# Patient Record
Sex: MALE | Race: WHITE | Employment: FULL TIME | URBAN - METROPOLITAN AREA
[De-identification: names, ages, dates, MRNs, and addresses within clinical notes are randomized per-mention and may not be internally consistent; named-entity substitution may affect disease eponyms.]

---
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
## 2019-04-18 ENCOUNTER — OFFICE VISIT - HEALTHEAST (OUTPATIENT)
Dept: FAMILY MEDICINE | Facility: CLINIC | Age: 19
End: 2019-04-18

## 2019-04-18 DIAGNOSIS — F32.4 MAJOR DEPRESSIVE DISORDER WITH SINGLE EPISODE, IN PARTIAL REMISSION (H): ICD-10-CM

## 2019-04-18 DIAGNOSIS — Z00.00 HEALTHCARE MAINTENANCE: ICD-10-CM

## 2019-04-18 DIAGNOSIS — F98.8 ATTENTION DEFICIT DISORDER, UNSPECIFIED HYPERACTIVITY PRESENCE: ICD-10-CM

## 2019-04-18 LAB — HIV 1+2 AB+HIV1 P24 AG SERPL QL IA: NEGATIVE

## 2019-04-18 ASSESSMENT — MIFFLIN-ST. JEOR: SCORE: 2229.25

## 2019-04-19 LAB
C TRACH DNA SPEC QL PROBE+SIG AMP: NEGATIVE
N GONORRHOEA DNA SPEC QL NAA+PROBE: NEGATIVE

## 2019-04-22 ENCOUNTER — COMMUNICATION - HEALTHEAST (OUTPATIENT)
Dept: FAMILY MEDICINE | Facility: CLINIC | Age: 19
End: 2019-04-22

## 2019-10-16 ENCOUNTER — AMBULATORY - HEALTHEAST (OUTPATIENT)
Dept: FAMILY MEDICINE | Facility: CLINIC | Age: 19
End: 2019-10-16

## 2019-10-16 DIAGNOSIS — F32.4 MAJOR DEPRESSIVE DISORDER WITH SINGLE EPISODE, IN PARTIAL REMISSION (H): ICD-10-CM

## 2019-10-25 NOTE — PROGRESS NOTES
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  Less social interactions because of work.  History of depression for years.  Has been on lexapro for 2 years. It helped but still feeling down depressed, withdrawn.  Lives with best friends, she too has depression and they look out for each other.  Also have weekly meet up with close friends to hand out with .  Strong family history of depression- mom takes medications-  Half Brother also takes medications .    He reports would like to stay on lexapro- has helped him in past.  Willing to try another medications  Is in counseling-once a week on Monday.    History of attention deficit hyperactivity disorder diagnosed in childhood- by a psychologist back child psychologist at  of .  Adderall 30 mg extended release helps  adderall last prescription - was from [facility]- and now out of it for past 4 days, kept making appointment and over sleeping & missed appointment     No side effects from  Adderall. Lower doses do not work.  Without adderall, un focus, unable to follow through with task    BP Readings from Last 3 Encounters:   10/28/19 113/68   10/12/15 106/70 (11 %/ 48 %)*   10/09/15 105/71 (10 %/ 54 %)*     *BP percentiles are based on the August 2017 AAP Clinical Practice Guideline for boys    Wt Readings from Last 3 Encounters:   10/28/19 112.7 kg (248 lb 6.4 oz)   10/12/15 99.8 kg (220 lb) (99 %)*   10/09/15 99.8 kg (220 lb 1.6 oz) (99 %)*     * Growth percentiles are based on CDC (Boys, 2-20 Years) data.                    PROBLEMS TO ADD ON...  Reviewed and updated as needed this visit by Provider         Review of Systems   ROS COMP:  "Constitutional, HEENT, cardiovascular, pulmonary, GI, , musculoskeletal, neuro, skin, endocrine and psych systems are negative, except as otherwise noted.      Objective    /68 (BP Location: Left arm, Patient Position: Sitting, Cuff Size: Adult Large)   Pulse 80   Temp 97.5  F (36.4  C) (Oral)   Resp 14   Ht 1.918 m (6' 3.5\")   Wt 112.7 kg (248 lb 6.4 oz)   SpO2 98%   BMI 30.64 kg/m    Body mass index is 30.64 kg/m .  Physical Exam   GENERAL: healthy, alert and no distress  NECK: no adenopathy, no asymmetry, masses, or scars and thyroid normal to palpation  RESP: lungs clear to auscultation - no rales, rhonchi or wheezes  CV: regular rates and rhythm and normal S1 S2, no S3 or S4  ABDOMEN: soft, nontender, no hepatosplenomegaly, no masses and bowel sounds normal  SKIN: no suspicious lesions or rashes  PSYCH: mentation appears normal, affect normal/bright  PHQ-9 SCORE 10/28/2019   PHQ-9 Total Score 14     Diagnostic Test Results:  Labs reviewed in Epic        Assessment & Plan   20 yo with long standing history of depression, & attention deficit hyperactivity disorder refills as following   1. Moderate episode of recurrent major depressive disorder (H)  Add Wellbutrin  Continue counseling  Follow up in TE, in 2 weeks and then in 6 weeks ,  Return office visit     - escitalopram (LEXAPRO) 10 MG tablet; Take 1 tablet (10 mg) by mouth daily  Dispense: 30 tablet; Refill: 3  - buPROPion (WELLBUTRIN XL) 150 MG 24 hr tablet; Take 1 tablet (150 mg) by mouth every morning  Dispense: 30 tablet; Refill: 3  Potential medication side effects were discussed with the patient; let me know if any occur.    2. Attention deficit hyperactivity disorder (ADHD), combined type  -controlled substance agreement- signed & scan for scanning.  Urine tox screen in future- random.  Return office visit in 3 month for first yr  - Drug Abuse Screen Panel 13, Urine (Pain Care Package); Future  - amphetamine-dextroamphetamine (ADDERALL " "XR) 30 MG 24 hr capsule; Take 1 capsule (30 mg) by mouth daily  Dispense: 30 capsule; Refill: 0  - amphetamine-dextroamphetamine (ADDERALL XR) 30 MG 24 hr capsule; Take 1 capsule (30 mg) by mouth daily  Dispense: 30 capsule; Refill: 0  - amphetamine-dextroamphetamine (ADDERALL XR) 30 MG 24 hr capsule; Take 1 capsule (30 mg) by mouth daily  Dispense: 30 capsule; Refill: 0     BMI:   Estimated body mass index is 30.64 kg/m  as calculated from the following:    Height as of this encounter: 1.918 m (6' 3.5\").    Weight as of this encounter: 112.7 kg (248 lb 6.4 oz).   Weight management plan: Discussed healthy diet and exercise guidelines        Work on weight loss  Regular exercise    Return in about 6 weeks (around 12/9/2019) for mood.    Malou Mayer MD  [facility]        "

## 2019-10-28 ENCOUNTER — OFFICE VISIT (OUTPATIENT)
Dept: FAMILY MEDICINE | Facility: CLINIC | Age: 19
End: 2019-10-28
Payer: COMMERCIAL

## 2019-10-28 VITALS
BODY MASS INDEX: 30.25 KG/M2 | HEART RATE: 80 BPM | TEMPERATURE: 97.5 F | DIASTOLIC BLOOD PRESSURE: 68 MMHG | OXYGEN SATURATION: 98 % | RESPIRATION RATE: 14 BRPM | WEIGHT: 248.4 LBS | SYSTOLIC BLOOD PRESSURE: 113 MMHG | HEIGHT: 76 IN

## 2019-10-28 DIAGNOSIS — Z79.899 CONTROLLED SUBSTANCE AGREEMENT SIGNED: ICD-10-CM

## 2019-10-28 DIAGNOSIS — Z11.8 SCREENING FOR CHLAMYDIAL DISEASE: ICD-10-CM

## 2019-10-28 DIAGNOSIS — F33.1 MODERATE EPISODE OF RECURRENT MAJOR DEPRESSIVE DISORDER (H): Primary | ICD-10-CM

## 2019-10-28 DIAGNOSIS — F90.2 ATTENTION DEFICIT HYPERACTIVITY DISORDER (ADHD), COMBINED TYPE: ICD-10-CM

## 2019-10-28 PROCEDURE — 87491 CHLMYD TRACH DNA AMP PROBE: CPT | Performed by: FAMILY MEDICINE

## 2019-10-28 PROCEDURE — 99203 OFFICE O/P NEW LOW 30 MIN: CPT | Performed by: FAMILY MEDICINE

## 2019-10-28 RX ORDER — DEXTROAMPHETAMINE SACCHARATE, AMPHETAMINE ASPARTATE MONOHYDRATE, DEXTROAMPHETAMINE SULFATE AND AMPHETAMINE SULFATE 7.5; 7.5; 7.5; 7.5 MG/1; MG/1; MG/1; MG/1
30 CAPSULE, EXTENDED RELEASE ORAL DAILY
Qty: 30 CAPSULE | Refills: 0 | Status: SHIPPED | OUTPATIENT
Start: 2019-10-28 | End: 2019-11-27

## 2019-10-28 RX ORDER — DEXTROAMPHETAMINE SACCHARATE, AMPHETAMINE ASPARTATE MONOHYDRATE, DEXTROAMPHETAMINE SULFATE AND AMPHETAMINE SULFATE 7.5; 7.5; 7.5; 7.5 MG/1; MG/1; MG/1; MG/1
30 CAPSULE, EXTENDED RELEASE ORAL DAILY
Qty: 30 CAPSULE | Refills: 0 | Status: SHIPPED | OUTPATIENT
Start: 2019-12-29 | End: 2020-01-28

## 2019-10-28 RX ORDER — DEXTROAMPHETAMINE SACCHARATE, AMPHETAMINE ASPARTATE, DEXTROAMPHETAMINE SULFATE AND AMPHETAMINE SULFATE 3.75; 3.75; 3.75; 3.75 MG/1; MG/1; MG/1; MG/1
15 TABLET ORAL 2 TIMES DAILY
Qty: 60 TABLET | Refills: 0 | Status: CANCELLED | OUTPATIENT
Start: 2019-10-28

## 2019-10-28 RX ORDER — DEXTROAMPHETAMINE SACCHARATE, AMPHETAMINE ASPARTATE MONOHYDRATE, DEXTROAMPHETAMINE SULFATE AND AMPHETAMINE SULFATE 7.5; 7.5; 7.5; 7.5 MG/1; MG/1; MG/1; MG/1
30 CAPSULE, EXTENDED RELEASE ORAL DAILY
Qty: 30 CAPSULE | Refills: 0 | Status: SHIPPED | OUTPATIENT
Start: 2019-11-28 | End: 2019-12-28

## 2019-10-28 RX ORDER — ESCITALOPRAM OXALATE 10 MG/1
10 TABLET ORAL DAILY
Qty: 30 TABLET | Refills: 3 | Status: SHIPPED | OUTPATIENT
Start: 2019-10-28 | End: 2020-03-19

## 2019-10-28 RX ORDER — BUPROPION HYDROCHLORIDE 150 MG/1
150 TABLET ORAL EVERY MORNING
Qty: 30 TABLET | Refills: 3 | Status: SHIPPED | OUTPATIENT
Start: 2019-10-28 | End: 2020-03-19

## 2019-10-28 ASSESSMENT — PATIENT HEALTH QUESTIONNAIRE - PHQ9: SUM OF ALL RESPONSES TO PHQ QUESTIONS 1-9: 14

## 2019-10-28 ASSESSMENT — PAIN SCALES - GENERAL: PAINLEVEL: MODERATE PAIN (5)

## 2019-10-28 ASSESSMENT — MIFFLIN-ST. JEOR: SCORE: 2230.3

## 2019-10-28 NOTE — LETTER
[facility]  10/28/19    Patient: Devon Su  YOB: 2000  Medical Record Number: [id number]  CSN: [id number]                                                                              Non-opioid Controlled Substance Agreement    I understand that my care provider has prescribed a controlled substance to help manage my condition(s). I am taking this medicine to help me function or work. I know this is strong medicine, and that it can cause serious side effects. Controlled substances can be sedating, addicting and may cause a dependency on the drug. They can affect my ability to drive or think, and cause depression. They need to be taken exactly as prescribed. Combining controlled substances with certain medicines or chemicals (such as cocaine, sedatives and tranquilizers, sleeping pills, meth) can be dangerous or even fatal. Also, if I stop controlled substances suddenly, I may have severe withdrawal symptoms.  If not helpful, I may be asked to stop them.    The risks, benefits, and side effects of these medicine(s) were explained to me. I agree that:    1. I will take part in other treatments as advised by my care team. This may be psychiatry or counseling, physical therapy, behavioral therapy, group treatment or a referral to a pain clinic. I will reduce or stop my medicine when my care team tells me to do so.  2. I will take my medicines as prescribed. I will not change the dose or schedule unless my care team tells me to. There will be no refills if I  run out early.   I may be contactedwithout warning and asked to complete a urine drug test or pill count at any time.   3. I will keep all my appointments, and understand this is part of the monitoring of controlled substances. My care team may require an office visit for EVERY controlled substance refill. If I miss appointments or don t follow instructions, my care team may stop my medicine.  4. I will not ask other providers to  prescribe controlled substances, and I will not accept controlled substances from other people. If I need another prescribed controlled substance for a new reason, I will tell my care team within 1 business day.  5. I will use one pharmacy to fill all of my controlled substance prescriptions, and it is up to me to make sure that I do not run out of my medicines on weekends or holidays. If my care team is willing to refill my controlled substance prescription without a visit, I must request refills only during office hours, refills may take up to 3 days to process, and it may take up to 5 to 7 days for my medicine to be mailed and ready at my pharmacy. Prescriptions will not be mailed anywhere except my pharmacy.    6. I am responsible for my prescriptions. If the medicine/prescription is lost or stolen, it will not be replaced. I also agree not to share controlled substance medicines with anyone.              [facility]  10/28/19  Patient:  Devon Su  YOB: 2000  Medical Record Number: [id number]  CSN: [id number]    7. I agree to not use ANY illegal or recreational drugs. This includes marijuana, cocaine, bath salts or other drugs. I agree not to use alcohol unless my care team says I may. I agree to give urine samples whenever asked. If I don t give a urine sample, the care team may stop my medicine.    8. If I enroll in the [place] Medical Marijuana program, I will tell my care team. I will also sign an agreement to share my medical records with my care team.    9. I will bring in my list of medicines (or my medicine bottles) each time I come to the clinic.   10. I will tell my care team right away if I become pregnant or have a new medical problem treated outside of my regular clinic.  11. I understand that this medicine can affect my thinking and judgment. It may be unsafe for me to drive, use machinery and do dangerous tasks. I will not do any of these things until I know how the  medicine affects me. If my dose changes, I will wait to see how it affects me. I will contact my care team if I have concerns about medicine side effects.    I understand that if I do not follow any of the conditions above, my prescriptions or treatment may be stopped.      I agree that my provider, clinic care team, and pharmacy may work with any city, state or federal law enforcement agency that investigates the misuse, sale, or other diversion of my controlled medicine. I will allow my provider to discuss my care with or share a copy of this agreement with any other treating provider, pharmacy or emergency room where I receive care. I agree to give up (waive) any right of privacy or confidentiality with respect to these consents.   I have read this agreement and have asked questions about anything I did not understand.    ____________________________________________________    ____________  ________  Patient signature                                                         Date      Time    ____________________________________________________     ____________  ________  Witness                                                          Date      Time    ____________________________________________________  Provider signature

## 2019-10-29 LAB
C TRACH DNA SPEC QL NAA+PROBE: NEGATIVE
SPECIMEN SOURCE: NORMAL

## 2019-11-11 ENCOUNTER — VIRTUAL VISIT (OUTPATIENT)
Dept: FAMILY MEDICINE | Facility: CLINIC | Age: 19
End: 2019-11-11
Payer: COMMERCIAL

## 2019-11-11 DIAGNOSIS — F33.1 MODERATE EPISODE OF RECURRENT MAJOR DEPRESSIVE DISORDER (H): Primary | ICD-10-CM

## 2019-11-11 PROCEDURE — 99441 ZZC PHYSICIAN TELEPHONE EVALUATION 5-10 MIN: CPT | Performed by: FAMILY MEDICINE

## 2019-11-11 ASSESSMENT — PATIENT HEALTH QUESTIONNAIRE - PHQ9: SUM OF ALL RESPONSES TO PHQ QUESTIONS 1-9: 4

## 2019-11-11 NOTE — PROGRESS NOTES
"Devon Su is a 19 year old male who is being evaluated via a telephone visit.      S: The history as provided by the patient to the provider during this 3 way call include   18 yo. Follow up on depression. Has been on lexapro, seen on 10/28 for worsening depression and wellbutrin  added on 10/28 & since the ,Mood is better & it is helping .  No suicidal thoughts, no anxiety. Mood is much better. Has been in weekly therapy.  Feels that resuming adderall also has contributed to imroved mood  he denies suicidal thoughts or ideation.reports no side effects from medications. Would like to continue.          Assessment/Plan:  Depression , recurrent   Advised to continue with - escitalopram (LEXAPRO) 10 MG  (3 refills avaliable)  & - buPROPion (WELLBUTRIN XL) 150 MG 24 hr tablet;- continue with therapy weekly  Follow up in 2 months, earlier if concerns    PHQ-9 SCORE 10/28/2019 11/11/2019   PHQ-9 Total Score 14 4           Pertinent parts of the the patient's medical history reviewed and confirmed by the provider included :      Total time of call between patient and provider was 7 minutes      (MD signature)  ===================================================    I have reviewed the note as documented above.  This accurately captures the substance of my conversation with the patient,    Additional provider notes:      The patient has been notified of following (by MOOSE messina      \"We have found that certain health care needs can be provided without the need for a physical exam.  This service lets us provide the care you need with a short phone conversation.  If a prescription is necessary we can send it directly to your pharmacy.  If lab work is needed we can place an order for that and you can then stop by our lab to have the test done at a later time.    This telephone visit will be conducted via 3 way call with the you (the patient) , the physician/provider, and a me all on the line at the same time.  This allows " "your physician/provider to have the phone conversation with you while I will be taking notes for your medical record.  We will have full access to your [facility] medical record during this entire phone call.    Since this is like an office visit,  will bill your insurance company for this service.  Please check with your medical insurance if this type of telephone/virtual is covered . You may be responsible for the cost of this service if insurance coverage is denied.  The typical cost is $30 (10min), $59(11-20min) and $85 (21-30min)     If during the course of the call the physician/provider feels a telephone visit is not appropriate, you will not be charged for this service\"    Consent has been obtained for this service by care team member: yes.  See the scanned image in the medical record.                  "

## 2020-03-19 ENCOUNTER — VIRTUAL VISIT (OUTPATIENT)
Dept: FAMILY MEDICINE | Facility: CLINIC | Age: 20
End: 2020-03-19
Payer: COMMERCIAL

## 2020-03-19 DIAGNOSIS — M72.2 PLANTAR FASCIA SYNDROME: ICD-10-CM

## 2020-03-19 DIAGNOSIS — M54.50 CHRONIC BILATERAL LOW BACK PAIN WITHOUT SCIATICA: ICD-10-CM

## 2020-03-19 DIAGNOSIS — G89.29 CHRONIC BILATERAL LOW BACK PAIN WITHOUT SCIATICA: ICD-10-CM

## 2020-03-19 DIAGNOSIS — F90.2 ATTENTION DEFICIT HYPERACTIVITY DISORDER (ADHD), COMBINED TYPE: ICD-10-CM

## 2020-03-19 DIAGNOSIS — F33.1 MODERATE EPISODE OF RECURRENT MAJOR DEPRESSIVE DISORDER (H): Primary | ICD-10-CM

## 2020-03-19 PROCEDURE — 99442 ZZC PHYSICIAN TELEPHONE EVALUATION 11-20 MIN: CPT | Performed by: PHYSICIAN ASSISTANT

## 2020-03-19 RX ORDER — BUPROPION HYDROCHLORIDE 150 MG/1
150 TABLET ORAL EVERY MORNING
Qty: 90 TABLET | Refills: 1 | Status: SHIPPED | OUTPATIENT
Start: 2020-03-19 | End: 2020-12-04

## 2020-03-19 RX ORDER — DEXTROAMPHETAMINE SACCHARATE, AMPHETAMINE ASPARTATE MONOHYDRATE, DEXTROAMPHETAMINE SULFATE AND AMPHETAMINE SULFATE 7.5; 7.5; 7.5; 7.5 MG/1; MG/1; MG/1; MG/1
30 CAPSULE, EXTENDED RELEASE ORAL DAILY
Qty: 30 CAPSULE | Refills: 0 | Status: SHIPPED | OUTPATIENT
Start: 2020-05-20 | End: 2020-06-19

## 2020-03-19 RX ORDER — ESCITALOPRAM OXALATE 10 MG/1
10 TABLET ORAL DAILY
Qty: 90 TABLET | Refills: 1 | Status: SHIPPED | OUTPATIENT
Start: 2020-03-19 | End: 2020-12-04

## 2020-03-19 RX ORDER — DEXTROAMPHETAMINE SACCHARATE, AMPHETAMINE ASPARTATE MONOHYDRATE, DEXTROAMPHETAMINE SULFATE AND AMPHETAMINE SULFATE 7.5; 7.5; 7.5; 7.5 MG/1; MG/1; MG/1; MG/1
30 CAPSULE, EXTENDED RELEASE ORAL DAILY
Qty: 30 CAPSULE | Refills: 0 | Status: SHIPPED | OUTPATIENT
Start: 2020-03-19 | End: 2020-04-18

## 2020-03-19 RX ORDER — DEXTROAMPHETAMINE SACCHARATE, AMPHETAMINE ASPARTATE MONOHYDRATE, DEXTROAMPHETAMINE SULFATE AND AMPHETAMINE SULFATE 7.5; 7.5; 7.5; 7.5 MG/1; MG/1; MG/1; MG/1
30 CAPSULE, EXTENDED RELEASE ORAL DAILY
Qty: 30 CAPSULE | Refills: 0 | Status: SHIPPED | OUTPATIENT
Start: 2020-04-19 | End: 2020-05-19

## 2020-03-19 ASSESSMENT — ANXIETY QUESTIONNAIRES
GAD7 TOTAL SCORE: 19
5. BEING SO RESTLESS THAT IT IS HARD TO SIT STILL: MORE THAN HALF THE DAYS
2. NOT BEING ABLE TO STOP OR CONTROL WORRYING: NEARLY EVERY DAY
1. FEELING NERVOUS, ANXIOUS, OR ON EDGE: NEARLY EVERY DAY
6. BECOMING EASILY ANNOYED OR IRRITABLE: MORE THAN HALF THE DAYS
3. WORRYING TOO MUCH ABOUT DIFFERENT THINGS: NEARLY EVERY DAY
7. FEELING AFRAID AS IF SOMETHING AWFUL MIGHT HAPPEN: NEARLY EVERY DAY
IF YOU CHECKED OFF ANY PROBLEMS ON THIS QUESTIONNAIRE, HOW DIFFICULT HAVE THESE PROBLEMS MADE IT FOR YOU TO DO YOUR WORK, TAKE CARE OF THINGS AT HOME, OR GET ALONG WITH OTHER PEOPLE: SOMEWHAT DIFFICULT

## 2020-03-19 ASSESSMENT — PATIENT HEALTH QUESTIONNAIRE - PHQ9
SUM OF ALL RESPONSES TO PHQ QUESTIONS 1-9: 22
5. POOR APPETITE OR OVEREATING: NEARLY EVERY DAY

## 2020-03-19 NOTE — PROGRESS NOTES
"Devon Su is a 20 year old male who is being evaluated via a telephone visit.      The patient has been notified of following (by M.A) Latisha Worthy MA  Medical Assistant  [facility]       \"We have found that certain health care needs can be provided without the need for a physical exam.  This service lets us provide the care you need with a short phone conversation.  If a prescription is necessary we can send it directly to your pharmacy.  If lab work is needed we can place an order for that and you can then stop by our lab to have the test done at a later time.    This telephone visit will be conducted via 3 way call with the you (the patient) , the physician/provider, and a me all on the line at the same time.  This allows your physician/provider to have the phone conversation with you while I will be taking notes for your medical record.  We will have full access to your [facility] medical record during this entire phone call.    Since this is like an office visit,  will bill your insurance company for this service.  Please check with your medical insurance if this type of telephone/virtual is covered . You may be responsible for the cost of this service if insurance coverage is denied.  The typical cost is $30 (10min), $59(11-20min) and $85 (21-30min)     If during the course of the call the physician/provider feels a telephone visit is not appropriate, you will not be charged for this service\"    Consent has been obtained for this service by care team member: yes.  See the scanned image in the medical record.    S: The history as provided by the patient to the provider during this 3 way call include     Pertinent parts of the the patient's medical history reviewed and confirmed by the provider included :      Total time of call between patient and provider was 13 minutes     Kale Donovan PA-C  (MD signature)  ===================================================    I have reviewed the note as " "documented above.  This accurately captures the substance of my conversation with the patient,    Additional provider notes:21 y/o new to me her for follow up of his depression.  He does have long hx of depression and was last seen by my partner end of Oct.  At that visit they did add wellbutrin to his lexapro.  Also continued his adderall, which has been chronic.  He followed up a few weeks later with virtual visit, and that things have been improving.  Since then, he does feel like things are stable, he does admit that he has been \"rationing\" meds, as he always has a been of anxiety with medical visits.  He has just ran out of wellbutrin, but not getting any withdrawal effects.    Stress has been higher due to the outbreak of COVID and him working from home.  Feels a bit isolated.  He did score high on PHQ with some thoughts of self harm.  He does not have any plans, and feels they are just fleeting.  Does not feel he would ever act on, and does feel safe at home.  Does feel like things will improve over time.    adderall continues to help him focus while working.  Does not take every day, and does not have any side effects. Sleep is fine, as is appetite.  Does not make anxiety worse.    He also has long term plantar fasciitis, and has used custome orthotics.  They have worn out, and is getting more pain.  Does wonder about follow up with podiatry.    He also admits to ongoing low back pain.  Has never really sough medical care, but has been flared up lately.  Interested in further evaluation and treatment.    Assessment/Plan:  MDD:  Refilled lexapro and wellbutrin.  He does have high PHQ 9, but he does feel like that may not be fully accurate.  Again we discussed his thought of self harm, and again he feels safe at home, no plan, and does not feel he would ever act upon.  Encouraged if symptoms persist or worsen, please reach out to myself or ER.  He agrees.    Plantar fascia.  Referred to podiatry.  Dicussed " nsaid at night, morning stretches, ice end of day.    ADHD.  Refilled adderall xr 30 mg every day for next 3 months.    Low back pain.  PHYSICAL THERAPY referral placed.  Discussed active ROM exercises, especially lumbar extension.    Follow up with telephone or e visit in 2-3 weeks.

## 2020-03-20 ASSESSMENT — ANXIETY QUESTIONNAIRES: GAD7 TOTAL SCORE: 19

## 2020-04-23 ENCOUNTER — VIRTUAL VISIT (OUTPATIENT)
Dept: PODIATRY | Facility: CLINIC | Age: 20
End: 2020-04-23
Attending: PHYSICIAN ASSISTANT
Payer: COMMERCIAL

## 2020-04-23 VITALS — WEIGHT: 250 LBS | HEIGHT: 76 IN | BODY MASS INDEX: 30.44 KG/M2

## 2020-04-23 DIAGNOSIS — M72.2 PLANTAR FASCIITIS, BILATERAL: Primary | ICD-10-CM

## 2020-04-23 PROCEDURE — 99213 OFFICE O/P EST LOW 20 MIN: CPT | Mod: 95 | Performed by: PODIATRIST

## 2020-04-23 ASSESSMENT — MIFFLIN-ST. JEOR: SCORE: 2240.49

## 2020-04-23 NOTE — PROGRESS NOTES
"Devon Su is a 20 year old male who is being evaluated via a billable telephone visit.      The patient has been notified of following:     \"This telephone visit will be conducted via a call between you and your physician/provider. We have found that certain health care needs can be provided without the need for a physical exam.  This service lets us provide the care you need with a short phone conversation.  If a prescription is necessary we can send it directly to your pharmacy.  If lab work is needed we can place an order for that and you can then stop by our lab to have the test done at a later time.    Telephone visits are billed at different rates depending on your insurance coverage. During this emergency period, for some insurers they may be billed the same as an in-person visit.  Please reach out to your insurance provider with any questions.    If during the course of the call the physician/provider feels a telephone visit is not appropriate, you will not be charged for this service.\"    Patient has given verbal consent for Telephone visit?  Yes    How would you like to obtain your AVS? Joel Siddiqi, [facility]  Podiatry / Foot & Ankle Surgery  [facility]      HPI:  The patient report his mother having history of plantar fasciitis and using custom orthoses.  When he was younger they noticed his gait was similar to his mother's.  To prevent problems in the future he was prescribed and use custom orthoses.  These devices are least 5 years old and he is seeking new devices.    Reports some recent significant foot pain at the plantar medial heel.  This has since resolved.  He added additional over-the-counter arch support to his shoes.  Considers himself having a rectus foot, neither flat or high arched.    Assessment:    History of bilateral plantar fasciitis.    Plan:  I think custom orthotic devices are reasonable request.  He is referred to [facility].  I " discussed the typical protocol and types of orthotics.  He is not currently having pain and I did not provide other specific details on plantar fasciitis.  I did however, tell him we will send him information in my chart that I would like him to review.  He was agreeable to this.  I welcomed him to follow-up in person in the future when stay at home recommendations for COVID.    __________________________________  Phone call duration: 12 minutes      Connor Spann DPM, FACFAS, MS    [facility] of Podiatry/Foot & Ankle Surgery

## 2020-04-23 NOTE — PATIENT INSTRUCTIONS
[facility]  [address]  [place] [address]  Phone: [phone]  Fax: [phone]    [facility]  [facility]  [address]  [place] [address]  Phone: [phone]  Fax: [phone] [facility]  [address]  [place] [address]  Phone: [phone]   Fax: [phone]   [facility]  [address]  [place] [address]  Phone: [phone]  Fax: [phone] [facility]  [address]  [place] [address]  Phone: [phone]   Fax: [phone]   [facility]   [address]  [place] [address]  Phone: [phone]  Fax: [phone] * Please call any location listed to make an appointment for a casting/fitting. Your referral was sent to their central office and they will all have the order on file.       PLANTAR FASCIITIS    Plantar fasciitis is often referred to as heel spurs or heel pain. Plantar fasciitis is a very common problem that affects people of all foot shapes, age, weight and activity level. Pain may be in the arch or on the weight-bearing surface of the heel. The pain may come on without injury or identifiable cause. Pain is generally present when first getting out of bed in the morning or up from a seated break.     CAUSES  The plantar fascia is a dense fibrous band of tissue that stretches across the bottom surface of the foot. The fascia helps support the foot muscles and arch. Plantar fasciitis is thought to be caused by mechanical strain or overload. Frequent walking without shoes or wearing unsupportive shoes is thought to cause structural overload and ultimately inflammation of the plantar fascia. Some people have heel spurs that can be seen on x-ray. The heel spur is actually a minor component of plantar fascitis and is largely ignored.       SELF TREATMENT   The easiest solution  is to stop walking around your home without shoes. Plantar fasciitis is largely a shoe problem. Shoes are either not being worn often enough or your current shoes are inadequate for your weight, foot structure or activity level. The majority of shoes on the market today are not sufficient to resist development of plantar fasciitis or to promote healing. Assume that your current shoes are inadequate and will need to be replaced. Even high quality shoes wear out with 6 months to one year of frequent use. Weight loss is another option. Losing ten pounds in the next two months may be enough to resolve the problem. Ice applied to the area of pain two to three times per day for ten minutes each session can be very helpful. This should continue until the problem resolves. Achilles tendon stretching is essential. Stretch multiple times daily to promote healing and to prevent recurrence in the future.     MEDICAL TREATMENT  Medical treatments often include custom arch supports, cortisone injections, physical therapy, splints to be worn in bed, prescription medications and surgery. The home treatments listed above will be necessary regardless of these advanced medical treatments. Surgery is rarely needed but is very helpful in selected cases.     PROGNOSIS  Plantar fasciitis can last from one day to a lifetime. Some people get intermittent fascitis that is very short-lived. Others suffer daily for years. Excessive body weight, frequent bare foot walking, long hours on the feet, inadequate shoes, predisposing foot structures and excessive activity such as running are all potential issues that lead to chronic and/or recurring plantar fascitis. Having plantar fasciitis means that you are forever prone to this problem and will require modification of some of the above factors. Most people seek treatment within one to four months. Healing usually requires a similar one to four month time frame. Healing time is relative to the  amount of effort spent treating the problem.   Plantar fasciitis is highly recurrent. Risk factors often continue, including return to bare foot walking, inadequate shoes, excessive body weight, excessive activities, etc. Your life style and foot structure may predispose you to recurrent plantar fasciitis. A daily prevention regimen can be very helpful. Ongoing use of shoe inserts, careful attention to appropriate shoes, daily Achilles stretching, etc. may prevent recurrence. Prompt attention at the earliest warning signs of heel pain can resolve the problem in as short as a few days.     EXERCISES    Stair Exercise: Step on the stairs with the ball of your foot and hold your position for at least 15 seconds, then slowly step down with the heels of your foot. You can do this daily and as often as you want.   Picking the Towel: Sit comfortably and then pick the towel up with your toes. You can use any object other than a towel as long as the material can be soft and you can pick it up with your toes.  Rolling the Bottle: Use a small ball or frozen water bottle and then roll it around with your foot.   Flex the Toes: Sit comfortably and then flex your toes by pointing it towards the floor or towards your body. This will relax and flex your foot and exercise your plantar fascia, the calf, and the Achilles tendon. The inability of the foot to stretch often causes the bunching up of the plantar fascia area leading to the pain.  Calf/Achilles Stretching: Lay on you back and raise one foot, then point your toes towards the floor. See photo below:               Hold each stretch for 10 seconds. Stretch 10 times per set, three sets per day. Morning, afternoon and evening. If your heel pain is very severe in the morning, consider doing the first set of stretches before you get out of bed.    THERAPIES DISCUSSED:  1.  Supportive Shoes: minimizing barefoot ambulation helps to provide cushion, padding and support to the ligament  that is inflamed. Socks, flip flops, flats and some slippers are not typically sufficient to provide support. Shoes should be worn even indoors  2.  Insert/Orthotics: ones with an arch support built in to them provide further stress relief for the ligament. See the information below on recommended inserts.  3. Icing: using a frozen water bottle or orange, and rolling it along the bottom of the arch/heel can help to alleviate discomfort, and can act as a tissue massage to the painful, inflamed ligament.  There is evidence that shows icing at least three times daily can be beneficial  4.  Antiinflammatory (NSAID): Ibuprofen, Aleve, as well as Tylenol can be used to help decrease symptoms and improve pain levels. If you have high blood pressure, heart disease, stomach or kidney problems, use antiinflammatories sparingly. Tylenol should not be used if you have liver problems.   5. Activity Modifications: if there are certain things that you do, whether it's going barefoot or certain shoes/activities, you should try to minimize those activities as much as possible until your symptoms are sufficiently resolved. Certainly, some activities, such as running on the treadmill, are easier to take a break from versus others, such as work or chores at home. If there are certain activities that hurt your heel, and you keep doing those activities that hurt your heel, your heel will keep hurting.  **If these initial therapies are insufficient, we have our tier 2 therapies that can more aggressively work to improve your symptoms and get you back to the activities that you enjoy!

## 2020-12-04 ENCOUNTER — VIRTUAL VISIT (OUTPATIENT)
Dept: FAMILY MEDICINE | Facility: CLINIC | Age: 20
End: 2020-12-04
Payer: COMMERCIAL

## 2020-12-04 DIAGNOSIS — F90.2 ATTENTION DEFICIT HYPERACTIVITY DISORDER (ADHD), COMBINED TYPE: ICD-10-CM

## 2020-12-04 DIAGNOSIS — F33.1 MODERATE EPISODE OF RECURRENT MAJOR DEPRESSIVE DISORDER (H): Primary | ICD-10-CM

## 2020-12-04 PROCEDURE — 99213 OFFICE O/P EST LOW 20 MIN: CPT | Mod: TEL | Performed by: PHYSICIAN ASSISTANT

## 2020-12-04 PROCEDURE — 96127 BRIEF EMOTIONAL/BEHAV ASSMT: CPT | Performed by: PHYSICIAN ASSISTANT

## 2020-12-04 RX ORDER — BUPROPION HYDROCHLORIDE 150 MG/1
150 TABLET ORAL EVERY MORNING
Qty: 90 TABLET | Refills: 1 | Status: SHIPPED | OUTPATIENT
Start: 2020-12-04 | End: 2021-04-28

## 2020-12-04 RX ORDER — DEXTROAMPHETAMINE SULFATE, DEXTROAMPHETAMINE SACCHARATE, AMPHETAMINE SULFATE AND AMPHETAMINE ASPARTATE 7.5; 7.5; 7.5; 7.5 MG/1; MG/1; MG/1; MG/1
CAPSULE, EXTENDED RELEASE ORAL
COMMUNITY
Start: 2020-10-04

## 2020-12-04 RX ORDER — DEXTROAMPHETAMINE SACCHARATE, AMPHETAMINE ASPARTATE MONOHYDRATE, DEXTROAMPHETAMINE SULFATE AND AMPHETAMINE SULFATE 7.5; 7.5; 7.5; 7.5 MG/1; MG/1; MG/1; MG/1
30 CAPSULE, EXTENDED RELEASE ORAL DAILY
Qty: 30 CAPSULE | Refills: 0 | Status: SHIPPED | OUTPATIENT
Start: 2021-01-04 | End: 2021-02-03

## 2020-12-04 RX ORDER — ESCITALOPRAM OXALATE 10 MG/1
10 TABLET ORAL DAILY
Qty: 90 TABLET | Refills: 1 | Status: SHIPPED | OUTPATIENT
Start: 2020-12-04 | End: 2021-04-28

## 2020-12-04 RX ORDER — DEXTROAMPHETAMINE SACCHARATE, AMPHETAMINE ASPARTATE MONOHYDRATE, DEXTROAMPHETAMINE SULFATE AND AMPHETAMINE SULFATE 7.5; 7.5; 7.5; 7.5 MG/1; MG/1; MG/1; MG/1
30 CAPSULE, EXTENDED RELEASE ORAL DAILY
Qty: 30 CAPSULE | Refills: 0 | Status: SHIPPED | OUTPATIENT
Start: 2020-12-04 | End: 2021-01-03

## 2020-12-04 RX ORDER — DEXTROAMPHETAMINE SACCHARATE, AMPHETAMINE ASPARTATE MONOHYDRATE, DEXTROAMPHETAMINE SULFATE AND AMPHETAMINE SULFATE 7.5; 7.5; 7.5; 7.5 MG/1; MG/1; MG/1; MG/1
30 CAPSULE, EXTENDED RELEASE ORAL DAILY
Qty: 30 CAPSULE | Refills: 0 | Status: SHIPPED | OUTPATIENT
Start: 2021-02-04 | End: 2021-03-06

## 2020-12-04 ASSESSMENT — PATIENT HEALTH QUESTIONNAIRE - PHQ9
5. POOR APPETITE OR OVEREATING: MORE THAN HALF THE DAYS
SUM OF ALL RESPONSES TO PHQ QUESTIONS 1-9: 17

## 2020-12-04 ASSESSMENT — ANXIETY QUESTIONNAIRES
7. FEELING AFRAID AS IF SOMETHING AWFUL MIGHT HAPPEN: SEVERAL DAYS
1. FEELING NERVOUS, ANXIOUS, OR ON EDGE: MORE THAN HALF THE DAYS
6. BECOMING EASILY ANNOYED OR IRRITABLE: MORE THAN HALF THE DAYS
GAD7 TOTAL SCORE: 13
5. BEING SO RESTLESS THAT IT IS HARD TO SIT STILL: MORE THAN HALF THE DAYS
2. NOT BEING ABLE TO STOP OR CONTROL WORRYING: MORE THAN HALF THE DAYS
3. WORRYING TOO MUCH ABOUT DIFFERENT THINGS: MORE THAN HALF THE DAYS
IF YOU CHECKED OFF ANY PROBLEMS ON THIS QUESTIONNAIRE, HOW DIFFICULT HAVE THESE PROBLEMS MADE IT FOR YOU TO DO YOUR WORK, TAKE CARE OF THINGS AT HOME, OR GET ALONG WITH OTHER PEOPLE: SOMEWHAT DIFFICULT

## 2020-12-04 NOTE — PROGRESS NOTES
"Devon Su is a 20 year old male who is being evaluated via a billable telephone visit.      The patient has been notified of following:     \"This telephone visit will be conducted via a call between you and your physician/provider. We have found that certain health care needs can be provided without the need for a physical exam.  This service lets us provide the care you need with a short phone conversation.  If a prescription is necessary we can send it directly to your pharmacy.  If lab work is needed we can place an order for that and you can then stop by our lab to have the test done at a later time.    Telephone visits are billed at different rates depending on your insurance coverage. During this emergency period, for some insurers they may be billed the same as an in-person visit.  Please reach out to your insurance provider with any questions.    If during the course of the call the physician/provider feels a telephone visit is not appropriate, you will not be charged for this service.\"    Patient has given verbal consent for Telephone visit?  Yes    What phone number would you like to be contacted at? [phone]    How would you like to obtain your AVS? Maria Teresahart    Subjective     Devon Su is a 20 year old male who presents via phone visit today for the following health issues:    HPI  Depression Followup    How are you doing with your depression since your last visit? Worsened     Are you having other symptoms that might be associated with depression? No    Have you had a significant life event?  Relationship Concerns     Are you feeling anxious or having panic attacks?anxiou but   No panic attacks    Do you have any concerns with your use of alcohol or other drugs? No    Social History     Tobacco Use     Smoking status: Never Smoker     Smokeless tobacco: Never Used   Substance Use Topics     Alcohol use: Not on file     Drug use: Not on file     PHQ 11/11/2019 3/19/2020 12/4/2020   PHQ-9 " Total Score 4 22 17   Q9: Thoughts of better off dead/self-harm past 2 weeks Not at all Several days Several days   F/U: Thoughts of suicide or self-harm No - -   F/U: Safety concerns No - -     RUSS-7 SCORE 3/19/2020 12/4/2020   Total Score 19 13     Last PHQ-9 12/4/2020   1.  Little interest or pleasure in doing things 1   2.  Feeling down, depressed, or hopeless 1   3.  Trouble falling or staying asleep, or sleeping too much 3   4.  Feeling tired or having little energy 3   5.  Poor appetite or overeating 3   6.  Feeling bad about yourself 2   7.  Trouble concentrating 2   8.  Moving slowly or restless 1   Q9: Thoughts of better off dead/self-harm past 2 weeks 1   PHQ-9 Total Score 17   Difficulty at work, home, or with people Somewhat difficult   In the past two weeks have you had thoughts of suicide or self harm? -   Do you have concerns about your personal safety or the safety of others? -     RUSS-7  12/4/2020   1. Feeling nervous, anxious, or on edge 2   2. Not being able to stop or control worrying 2   3. Worrying too much about different things 2   4. Trouble relaxing 2   5. Being so restless that it is hard to sit still 2   6. Becoming easily annoyed or irritable 2   7. Feeling afraid, as if something awful might happen 1   RUSS-7 Total Score 13   If you checked any problems, how difficult have they made it for you to do your work, take care of things at home, or get along with other people? Somewhat difficult     In the past two weeks have you had thoughts of suicide or self-harm?  No.    Do you have concerns about your personal safety or the safety of others?   No    Suicide Assessment Five-step Evaluation and Treatment (SAFE-T)      How many servings of fruits and vegetables do you eat daily?  0-1    On average, how many sweetened beverages do you drink each day (Examples: soda, juice, sweet tea, etc.  Do NOT count diet or artificially sweetened beverages)?   2     How many days per week do you exercise  enough to make your heart beat faster? 3 or less    How many minutes a day do you exercise enough to make your heart beat faster? 60 or more  How many days per week do you miss taking your medication? 3    What makes it hard for you to take your medications?  remembering to take and when running low      19 y/o male here for 6 months follow up.  He was new to me back in March where he was struggling with chronic mental health concerns.  He also struggles with executive function, and the adderall helps this along with ADHD.  He does find that he does quite a bit better when he takes the lexapro and wellbutrin consistently, but admits to missing 2-3 times a week.  Is working on this.    He has admitted thoughts of self harm, but does not feel the really match his symptoms.  More fleeting.  Feeling overall better than he was in March and does not have any concerns of her safety.    Medication Followup of adderall    Taking Medication as prescribed: yes    Side Effects:  None    Medication Helping Symptoms:  yes       Review of Systems   Constitutional, HEENT, cardiovascular, pulmonary, gi and gu systems are negative, except as otherwise noted.       Objective          Vitals:  No vitals were obtained today due to virtual visit.    alert and no distress  PSYCH: Alert and oriented times 3; coherent speech, normal   rate and volume, able to articulate logical thoughts, able   to abstract reason, no tangential thoughts, no hallucinations   or delusions  His affect is normal  RESP: No cough, no audible wheezing, able to talk in full sentences  Remainder of exam unable to be completed due to telephone visits    No results found for this or any previous visit (from the past 24 hour(s)).        Assessment/Plan:    Assessment & Plan     Moderate episode of recurrent major depressive disorder (H)  Devon has pretty good insight into his diagnosis and treatment.  We both agree consistency of taking meds would overall help.  He  "does not wish to adjust meds at this time.  Open to counseling, but would like to focus on LGBTQ issues if possible.  - EMOTIONAL / BEHAVIORAL ASSESSMENT  - buPROPion (WELLBUTRIN XL) 150 MG 24 hr tablet; Take 1 tablet (150 mg) by mouth every morning  - escitalopram (LEXAPRO) 10 MG tablet; Take 1 tablet (10 mg) by mouth daily    Attention deficit hyperactivity disorder (ADHD), combined type    - amphetamine-dextroamphetamine (ADDERALL XR) 30 MG 24 hr capsule; Take 1 capsule (30 mg) by mouth daily  - amphetamine-dextroamphetamine (ADDERALL XR) 30 MG 24 hr capsule; Take 1 capsule (30 mg) by mouth daily  - amphetamine-dextroamphetamine (ADDERALL XR) 30 MG 24 hr capsule; Take 1 capsule (30 mg) by mouth daily     BMI:   Estimated body mass index is 30.43 kg/m  as calculated from the following:    Height as of 4/23/20: 1.93 m (6' 4\").    Weight as of 4/23/20: 113.4 kg (250 lb).          Depression Screening Follow Up    PHQ 12/4/2020   PHQ-9 Total Score 17   Q9: Thoughts of better off dead/self-harm past 2 weeks Several days   F/U: Thoughts of suicide or self-harm -   F/U: Safety concerns -              Follow Up      Follow Up Actions Taken  Mental Health Referral placed    Discussed the following ways the patient can remain in a safe environment:  be around others          No follow-ups on file.    Cosme Donovan PA-C  [facility]    Phone call duration:  12 minutes              "

## 2020-12-05 ASSESSMENT — ANXIETY QUESTIONNAIRES: GAD7 TOTAL SCORE: 13

## 2020-12-14 ENCOUNTER — HEALTH MAINTENANCE LETTER (OUTPATIENT)
Age: 20
End: 2020-12-14

## 2021-04-28 ENCOUNTER — IMMUNIZATION (OUTPATIENT)
Dept: NURSING | Facility: CLINIC | Age: 21
End: 2021-04-28
Payer: COMMERCIAL

## 2021-04-28 DIAGNOSIS — F33.1 MODERATE EPISODE OF RECURRENT MAJOR DEPRESSIVE DISORDER (H): ICD-10-CM

## 2021-04-28 DIAGNOSIS — F90.2 ATTENTION DEFICIT HYPERACTIVITY DISORDER, COMBINED TYPE: Primary | ICD-10-CM

## 2021-04-28 PROCEDURE — 91300 PR COVID VAC PFIZER DIL RECON 30 MCG/0.3 ML IM: CPT

## 2021-04-28 PROCEDURE — 0001A PR COVID VAC PFIZER DIL RECON 30 MCG/0.3 ML IM: CPT

## 2021-04-28 RX ORDER — ESCITALOPRAM OXALATE 10 MG/1
10 TABLET ORAL DAILY
Qty: 90 TABLET | Refills: 0 | Status: SHIPPED | OUTPATIENT
Start: 2021-04-28

## 2021-04-28 RX ORDER — BUPROPION HYDROCHLORIDE 150 MG/1
150 TABLET ORAL EVERY MORNING
Qty: 90 TABLET | Refills: 0 | Status: SHIPPED | OUTPATIENT
Start: 2021-04-28

## 2021-04-28 NOTE — TELEPHONE ENCOUNTER
Sent remainder of bupropion and escitalopram to new pharmacy/mail order.    Pt requesting refills on Adderall

## 2021-04-28 NOTE — TELEPHONE ENCOUNTER
JS,    Controlled Substance Refill Request for Adderall XR 30 mg    Last refill: 2/10/2021 - #30 ()    Last clinic visit: 12/4/2020 - virtual visit    Clinic visit frequency required: Q 6  months  Next appt: none    Controlled substance agreement on file: No.    Documentation in problem list reviewed:  Yes    Processing:  Rx to be electronically transmitted to pharmacy by provider     Thanks,  Miesha Maynard RN

## 2021-04-29 RX ORDER — DEXTROAMPHETAMINE SACCHARATE, AMPHETAMINE ASPARTATE MONOHYDRATE, DEXTROAMPHETAMINE SULFATE AND AMPHETAMINE SULFATE 7.5; 7.5; 7.5; 7.5 MG/1; MG/1; MG/1; MG/1
30 CAPSULE, EXTENDED RELEASE ORAL DAILY
Qty: 30 CAPSULE | Refills: 0 | Status: SHIPPED | OUTPATIENT
Start: 2021-05-28 | End: 2021-06-27

## 2021-04-29 RX ORDER — DEXTROAMPHETAMINE SACCHARATE, AMPHETAMINE ASPARTATE MONOHYDRATE, DEXTROAMPHETAMINE SULFATE AND AMPHETAMINE SULFATE 7.5; 7.5; 7.5; 7.5 MG/1; MG/1; MG/1; MG/1
30 CAPSULE, EXTENDED RELEASE ORAL DAILY
Qty: 30 CAPSULE | Refills: 0 | Status: SHIPPED | OUTPATIENT
Start: 2021-04-29 | End: 2021-05-29

## 2021-04-29 RX ORDER — DEXTROAMPHETAMINE SACCHARATE, AMPHETAMINE ASPARTATE MONOHYDRATE, DEXTROAMPHETAMINE SULFATE AND AMPHETAMINE SULFATE 7.5; 7.5; 7.5; 7.5 MG/1; MG/1; MG/1; MG/1
30 CAPSULE, EXTENDED RELEASE ORAL DAILY
Qty: 30 CAPSULE | Refills: 0 | Status: SHIPPED | OUTPATIENT
Start: 2021-06-28 | End: 2021-07-28

## 2021-05-19 ENCOUNTER — IMMUNIZATION (OUTPATIENT)
Dept: NURSING | Facility: CLINIC | Age: 21
End: 2021-05-19
Attending: INTERNAL MEDICINE
Payer: COMMERCIAL

## 2021-05-19 PROCEDURE — 91300 PR COVID VAC PFIZER DIL RECON 30 MCG/0.3 ML IM: CPT

## 2021-05-19 PROCEDURE — 0002A PR COVID VAC PFIZER DIL RECON 30 MCG/0.3 ML IM: CPT

## 2021-05-27 NOTE — PROGRESS NOTES
"I spent over  25 minutes spent, greater than 50% was counseling regarding following issues:    Problem List Items Addressed This Visit        High    ADD (attention deficit disorder) - Primary     Patient off the medicine for a while, did not get this refilled and difficulty with motivation, has been depressed lately.  Tolerating medication while he is on it, up-to-date with UDS.  Refill 3 to medication, follow-up for next refill.         Relevant Medications    dextroamphetamine-amphetamine (ADDERALL XR) 30 MG 24 hr capsule    dextroamphetamine-amphetamine (ADDERALL XR) 30 MG 24 hr capsule (Start on 5/18/2019)    dextroamphetamine-amphetamine (ADDERALL XR) 30 MG 24 hr capsule (Start on 6/18/2019)    escitalopram oxalate (LEXAPRO) 10 MG tablet       Unprioritized    Major depressive disorder with single episode, in partial remission (H)     worsened  PHQ9= 18  Intervention(s):  Medication?  Ran out of medications few months ago previously on Lexapro 20 mg  Working with therapist?  Previously, now inadequate time, pledges to do this in the near future when he is able.  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               Relevant Medications    dextroamphetamine-amphetamine (ADDERALL XR) 30 MG 24 hr capsule    dextroamphetamine-amphetamine (ADDERALL XR) 30 MG 24 hr capsule (Start on 5/18/2019)    dextroamphetamine-amphetamine (ADDERALL XR) 30 MG 24 hr capsule (Start on 6/18/2019)    escitalopram oxalate (LEXAPRO) 10 MG tablet    Healthcare maintenance    Relevant Orders    HIV Antigen/Antibody Screening [facility]    Chlamydia trachomatis & Neisseria gonorrhoeae, Amplified Detection          Pt presented for:  Chief Complaint   Patient presents with     STI     Medication Refill       DepressionF/U    ---------------------  Perception on how things are going: not well-   - getting fired " soon  Because of poor production-   Group of friends that t games just fell apart  PHQ 9= 18  Limited friends [place]  Not exercising  Problematic symptoms: Sleep issues, tired, concentration issues  Symptoms of paranoia or hallucinations?  No  Sleeping well?  No  Suicidal thoughts?  Admits to mild thoughts, never had a plan.  Easily suppressed    Antidepressant medication? Yes/Lexapro 20 mg  Working with a therapist?  No, but wants to in the near future, and he gets time      Regular exercise?  No, but pledges to do this to come his gained some weight      ADHD follow-up  Follow up on goals of medication: Concentration, ability to work well  parents overall assessment of how things are going: Poorly, has been off medication for quite some time  medication: Adderall XR 30 mg p.o. daily  taking regularly?  No, prescription ran out and did not get refills because he was depressed and had trouble with executive function  any side effects?  Not when he was taking it  Comments: Going to be losing job soon  Agreement onf F/u interval: 3 months for now  [place] UDS: August, not yet due

## 2021-05-28 NOTE — TELEPHONE ENCOUNTER
Central PA team  [phone]  Pool: HE PA MED ([id number])          PA has been initiated.       PA form completed and faxed insurance via Cover My Meds     Key:  [id number]     Medication:  Amphetamine-Dextroamphet ER 30MG OR CP24    Insurance:  [employer]        Response will be received via fax and may take up to 5-10 business days depending on plan

## 2021-05-28 NOTE — TELEPHONE ENCOUNTER
Medication Question or Clarification  Who is calling: Patient  What medication are you calling about? (include dose and sig)   dextroamphetamine-amphetamine (ADDERALL XR) 30 MG 24 hr capsule 30 capsule       Who prescribed the medication?: [facility]   What is your question/concern?: Patient states insurance will not cover medication until PCP calls and confirms that he needs this medication. CMA did ask patient did he in -fact need a PA and he was unsure and did not have insurance card as he states he lost his wallet .   Please advise on the next best course of action as patient has not obtained medication .     [facility] [id number] [facility] [address] [place] [address] AVE AT [address]    Okay to leave a detailed message?: Yes  Site CMT - Please call the pharmacy to obtain any additional needed information.

## 2021-05-30 VITALS — HEIGHT: 75 IN | BODY MASS INDEX: 29.22 KG/M2 | WEIGHT: 235 LBS

## 2021-05-31 VITALS — BODY MASS INDEX: 29.22 KG/M2 | WEIGHT: 235 LBS | HEIGHT: 75 IN

## 2021-05-31 VITALS — HEIGHT: 75 IN | WEIGHT: 236 LBS | BODY MASS INDEX: 29.34 KG/M2

## 2021-05-31 VITALS — BODY MASS INDEX: 29.84 KG/M2 | HEIGHT: 75 IN | WEIGHT: 240 LBS

## 2021-05-31 VITALS — HEIGHT: 75 IN | BODY MASS INDEX: 29.22 KG/M2 | WEIGHT: 235 LBS

## 2021-06-01 VITALS — WEIGHT: 234 LBS | BODY MASS INDEX: 29.25 KG/M2

## 2021-06-01 VITALS — BODY MASS INDEX: 29.47 KG/M2 | WEIGHT: 235.75 LBS

## 2021-06-02 VITALS — WEIGHT: 229.31 LBS | BODY MASS INDEX: 28.66 KG/M2

## 2021-06-03 VITALS — HEIGHT: 76 IN | WEIGHT: 249.5 LBS | BODY MASS INDEX: 30.38 KG/M2

## 2021-06-10 NOTE — PROGRESS NOTES
15 minutes spent greater than 50% was counseling regarding following issues  1. ADD (attention deficit disorder)  Patient getting good results with Adderall X R 30.  Using for days he works and in school.  However school is over now.  Anticipates using 2-3 times a week with work.  Would like to be working more.    Intentions for next year, after graduation, are unclear.  Planning to take some time off before going back to school for sure.  It is that school makes him feel stressed.     Controlled substance contract signed today.  Agreed to 3 months at a time of medication, anticipate this will be significantly less, follow-up at least yearly.  - Drugs of Abuse 1,Urine  Patient did experiment with marijuana about 2 weeks ago.  States that he does not intend to do this regularly but was quite forthcoming about it.  I discussed that this would not interfere with my ability to prescribe at this time.  Did discourage use of this especially given his underlying ADD diagnosis.  - dextroamphetamine-amphetamine (ADDERALL XR) 30 MG 24 hr capsule; Take 1 capsule (30 mg total) by mouth every morning.  Dispense: 30 capsule; Refill: 0      Patient also reports that he has a girlfriend and he and GDR anticipating becoming sexually active in the next few weeks.  Discussed condom use etc.

## 2021-06-10 NOTE — PROGRESS NOTES
"Over 25 minutes spent greater than 50% of this counseling regarding following issues:  1. ADD (attention deficit disorder)  Previously diagnosed and treated during high school.  Now still in his senior year of high school, taking [employer] classes at  [employer]    Difficulty with focus.  Wishes to be able to stay on task while at work.  Wants to be able to focus and take good notes during class.      Start Dexedrine 30 mg p.o. every morning.  Follow-up 1 month.  Previously tolerated this medication.  2. Insomnia  May be contributing to above.  Difficulty getting himself to bed and then eventually asleep on time, then, hard to wake up in the morning for work.  Often able to sleep in however.  Averaging between 6 and 8 hours asleep at night.  When he does not have to work, generally gets 8.  When he does have to work in the morning gets 6 or 7 hours.  Has his phone in bed which often distracts him.  Has a girlfriend he often text still late.  Encouraged him to try to be proactive and set self rules and have some self-discipline with this.  May need to discuss further in the future.  Previously saw a sleep specialist for same problem.  He is quite a bit younger at this time.  3. Depressed mood  By his history, this sounds very transient and is really mood rather than consistent symptoms.  Mood goes up and down.  Reassured.    ----------------------------------    Patient's child psychiatrist, Dr Macias, moved out of state    Has not been on the medication for some time    Was seeing therapist for a while- moved position    Depression and anxiety was what he and therapist were working on  Seems like this is under control  Compaired to other people      Depression is \"not good\"  Being tired seems to be a big part   Empty- nothing matters-   Not sad  Doesn't really enjoy things  Comes and goes    Insomnia-   Doesn't go to sleep- doesn't feel tired  Commonly up until 1-2 in the am  Sets phone aside to try to get to sleep.  Generally " tired earlier in the day.    Averages 8 hours on non-work days  Other days 5.5-7    No illicit drugs.  Patient is not sexually active.

## 2021-06-11 NOTE — PROGRESS NOTES
Devon presents with ongoing problems with left nailbed.  Please see my note from 6/26.  He had had partial nail movable in the past and had a recurrence of infection on the nail.  It appeared that there was no nail remaining under the medial side of the nail bed.  He is now second guessing that as MRI.  He is taking the antibiotics and on the soaks and while the swelling has gone down, he continues to have significant pain.    Exam shows sanguinous/cloudy discharge from the medial edge of the nail bed.  He has the edge of the nail that he is cut angling back and being buried into the bed proximally.    Discussed that there probably was still a spur of nail remaining in the medial edge of the nail.  He agreed.  Recommend partial toenail removal.      Partial toenail removal  Left great Toe medial side  Indication:Paronychia.    Obtained request/consent from patient after discussion of risks and benefits including bleeding, and infection.    15 cc of lidocaine were instilled with digital block, in typical fashion.  Additional injection was done distal to the IP joint on the affected side.  The toe was then tested for anesthesia.    Longitudinal slit was made in the nail with a 0 suture scissors and carried back into the nail bed.  The lateral/affected nail was then twisted off using a needle parra  Was nail  used?  No  Was phenol used to ablate the nail bed?  Yes    Prescriptions rendered?  None  Toe was sterilely dressed using sterile tube gauze that patient is to leave on overnight.  After this okay to shower.  Follow-up as needed for problems/signs of infection.

## 2021-06-11 NOTE — PROGRESS NOTES
Assessment/ Plan  1. Paronychia  It appears that medial side of nail bed was successfully ablated and this is where the infection is coming from.  There does not appear to be ingrown nail.  Cephalexin, soaks, follow-up if not improving.    Body mass index is 29.77 kg/(m^2).    Subjective  CC:  Chief Complaint   Patient presents with     Nail Problem     right big toe infected     HPI:    Problem: Pain and discharge of left great toe nailbed medial side  Narrative-patient with history of partial toenail removal.  2013 or 2014 by his account.  Was not seen here for this.  It appears that he had the nail bed ablated  ___________________________  Notes  Duration/ Timing/ context-current problem last few weeks  Location-as above  Severity-moderate pain, particularly when he bumps it.  Anything make it better?-No  Anything make it worse?-No    No fever or chills.PFSH:  Current medications reviewed as follows:  Current Outpatient Prescriptions on File Prior to Visit   Medication Sig     [START ON 7/15/2017] dextroamphetamine-amphetamine (ADDERALL XR) 30 MG 24 hr capsule Take 1 capsule (30 mg total) by mouth every morning.     ibuprofen (ADVIL,MOTRIN) 800 MG tablet      No current facility-administered medications on file prior to visit.      Patient Active Problem List    Diagnosis Date Noted     ADD (attention deficit disorder)      Priority: High     Overview Note:     Created by Conversion         Anemia      Overview Note:     Created by Conversion         Abdominal Pain      Overview Note:     Created by Conversion    Replacement Utility updated for latest IMO load       Abnormal Blood Chemistry      Overview Note:     Created by Conversion    Replacement Utility updated for latest IMO load       History   Smoking Status     Never Smoker   Smokeless Tobacco     Not on file     Social History     Social History Narrative     Patient Care Team:  Home Razo MD as PCP - General  ROS  As  "above    Objective  Physical Exam  Vitals:    06/26/17 1136   BP: 122/62   Patient Site: Left Arm   Patient Position: Sitting   Cuff Size: Adult Regular   Pulse: 80   Resp: 20   Temp: 97.7  F (36.5  C)   TempSrc: Oral   Weight: (!) 235 lb (106.6 kg)   Height: 6' 2.5\" (1.892 m)     Swelling, serosanguineous drainage nail bed.  Lateral quarter of nail is \"missing\" erythema and swelling around medial side of the nail.  Does not appear to be any ingrown nail.  Diagnostics  None    Please note: Voice recognition software was used in this dictation.  It may therefore contain typographical errors.    "

## 2021-06-13 NOTE — PROGRESS NOTES
"Brief Diagnostic Assessment      Date(s): 10/27/2017  Start Time: 10:00am  Stop Time: 11:00am    Patient Name: Devon Su  Age: 17 y.o.       2000    Referral Source: Home Razo MD- PCP  Therapist: Iris BRITT                                                                                    Persons Present: Patient and therapist    Chief Complaint (in the patients words; reason patient believes they have been referred):  Discussed with Dr. Razo that he has a lot of things happening in life right now. Patient states that lately he feels \"life is a balancing act.\"    Patient expectation for treatment:   \"A place to vent and coping skills to help create a change.\"    Recipient's description of symptoms (including reason for referral):   -Referred by PCP due to increase in anxiety/stress.   -Diagnosis of ADD (7-8 years ago) and recently a diagnosis of depression by PCP (about 1 month ago)    Problems:  -Chest pains, Inability to Sleep (sleeping about 3-4 hours/night), Decreased energy  -Decreased social activity  -Distractibility, Poor attention, Indecisiveness, Poor memory/forgetful, Perfectionism, Disordered thinking, Racing thoughts, Procrastination  -Worries and anxious, Apathetic, Emptiness, Depressed mood  -Patient questions having hallucinations and paranoia, but it is not clear. He feels he may have hallucinations when he is sleep deprived. He notes when he is in a crowd or around other sounds, he experiences \"mishearing or replacing\" things so he wonders about possible auditory hallucinations. In regards to visual, he states he thinks he sees things at the edge of his vision at times he is sleep deprived. In regards to paranoia, he states having a \"hard time with strangers\" or when he is walking around [place], he goes \"on the side of the road where cars are coming.\" If he walks alone at night, he \"worries and feels like he is being watched.\"    Mental Status Exam:  Grooming: " "Well groomed- appeared tired. yawning  Attire: Appropriate  Age: Appears Stated  Behavior Towards Examiner: Cooperative  Motor Activity: Within normal   Eye Contact: Appropriate  Mood: Euthymic  Affect: Congruent w/content of speech, Anxious, Depressed and Tired, yawning  Speech/Language: Within normal  Attention: Distractible  Concentration: Brief  Thought Process: Within normal  Thought Content: Within noraml  Within normal  Orientation: X 3No Evidence of Impairment  Memory: Impairment  Judgement: No Evidence of Impairment  Estimated Intelligence: Average  Demonstrated Insight: Adequate  Fund of Knowledge: adequate    Current living situation (including household membership and housing status):   Lives in a house that a friend owns. Has 5 housemates. He calls it a \"house of introverts.\"   He feels things are going well with his living situation.    Basic needs status including economic status:   Would like a raise at his job and reports \"money is tight.\"  He pays rent, but notes sometimes it is hard to buy enough food. \"Not a lot of spare money.\"    Education level:   Graduated [employer] last year.  Currently in college at [employer] for an Associate's Degree (Education Major). Although he notes, \"I couldn't be a teacher.\"  He notes, \"my parents want me to have a degree. 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  He reports he is looking for new jobs.     Significant personal relationships (including recipient's evaluation of relationship quality:  Girlfriend- dating 8 months. \"Happy.\" They are both into Dungeons and Dragons. She is still in high school, which is where they met.     Patient reports a good relationship with his mother and father, although they  when he was 1. He does not remember them " "being together. He reports being closest to his mother because \"it was just the two of us.\" Patient split the time evenly between his parents when he was growing up. His father remarried when he was about 4 years old. He feels his step-mother often gives him the \"guilt trip about household stuff.\" He reports his parents were like \"helicopter parents\" at times.     Strengths and resources (including extent and quality of social networks):  Patient is close with his girlfriend and friends.   He struggled to identify personal strengths.  He is interested in onel, specifically Dungeons and Dragons, which is considers his \"main focus.\"   He calls this hobby \"a healthy way of dissociating.\"    When he was young he played baseball. During his adolescent years he mostly spent his time on chat sites, \"making up characters and pretending I was someone else.\"    Belief system:  Raised [place] by 3 parents who were Caodaism and became Lopez.   Currently considers himself Agnostic. He stated, \"If there is something bigger, it doesn't care about me. I'm the smallest gear in a big clock. We are miniscule- one small piece.\"     Contextual non-personal factors contributing to the recipient's presenting concerns:  None Reported     General physical health and relationship to recipient's culture:  Patient is .  He was born in [place] and moved to [place] when he was 1 years old. He doesn't like \"[place] nice passive aggression.\" He considers himself an introvert. [place] [facility]. However, he believes that \"life is too short to worry about things.\" For example, he reports he will still eat meat and live life to enjoy it even though he knows there is a family history of heart attacks.     Current medications:  Current Outpatient Prescriptions   Medication Sig Dispense Refill     dextroamphetamine-amphetamine (ADDERALL XR) 30 MG 24 hr capsule Take 1 capsule (30 mg total) " "by mouth every morning. 30 capsule 0     escitalopram oxalate (LEXAPRO) 5 MG tablet 1 po qd x 14 days then 2 po qd 60 tablet 6     ibuprofen (ADVIL,MOTRIN) 800 MG tablet        No current facility-administered medications for this visit.         Substance use, abuse, or dependency:  CAGE = 0/4  Patient denies any alcohol use. He reports he tried it once and doesn't like the taste. He denies any cigarette use. He notes his parents used to smoke heavily and he chooses not to.     Patient does report smoking marijuana \"rarely.\" He first used it within the last year, around August/September. The last time he used marijuana was \"a couple weeks ago.\" He reports his housemates smoke marijuana frequently. He notes smoking marijuana about 4 times since August/September of this year. He reports he \"doesn't buy or seek it.\" He notes that if people have it and offer it to him, he will do it, but he will not go out of his way to smoke it.     He reports caffeine use and that he drinks Diet Coke \"more than I should.\"    No history of CD treatment.   No history or concerns of other addictive behaviors/compulsions.    Medical History    Patient Active Problem List   Diagnosis     Anemia     Abdominal Pain     Abnormal Blood Chemistry     ADD (attention deficit disorder)      Other standardized screening instruments:  Measures completed: WHODAS, PANSI, CAGE, PHQ-9, RUSS-7.      WHODAS 2.0 12-item version: total = 4 or 8%      Scores presented in qualifiers to represent level of disability.  MILD Problem (slight, low, ...) 5-24%    H1= 30  H2= 0  H3= 3     He reports moderate difficulty with standing for long periods of time.   He reports mild difficulty with taking care of household responsibilities and concentrating.       Clinical summary that explains the provisional diagnosis:  Patient is an 17 year old,  male who presented for a diagnostic assessment. He was referred by his PCP, Dr. Razo. Patient appeared tired and " "was yawning throughout session. He currently works 2 part time jobs and attends school for an Associate's degree in Education; however, he reports he does not want to be a teacher. He has a girlfriend and lives in a house with 5 roommates. His parents got  when he was 1 years old. His father remarried when patient was around 4 years old. He has a younger half brother who is 12 years old. He reports a \"pretty decent childhood.\" He notes he mostly lived with his mother and was closest to her. Currently, he is feeling an increase in stress with working 2 jobs and attending school. He would like to have coping skills to manage everything that is happening in life as it \"feels like a balancing act.\"     Patient reports being diagnosed with ADD about 7-8 years ago. He is currently taking Adderall and feels he is \"doing ok\" on the medications. He reports difficulty sustaining attention in tasks. He does not express interest in college and working; therefore, it appears he dislikes tasks that require sustained mental effort. During session, he was easily distracted by extraneous stimuli. He reports forgetfulness and poor memory. Based on patient's reported history and presenting symptoms, it appears patient meets DSM-5 criteria for Attention-Deficit Disorder.      Patient reports being diagnosed with depression by his PCP about 1 month ago. He does not report being diagnosed with depression prior to this, but rather states he has received treatment for \"stress management.\" He previously attending counseling at the [facility], which he believes was about 1 year ago. Therefore, it is not clear if patient has had recurrent episodes of depression. Patient reports more of a history of anxiety or stress than he does \"depression.\"  Patient is currently prescribed escitalopram, which is an SSRI for the treatment of depression. Patient endorses the following symptoms for longer than a 2 week period: depressed mood, inability to " "sleep, decreased energy, decreased social activity, feeling tired, change in appetite, and inability to concentrate. He reports perfectionism and feelings of emptiness and apathetic. He reports \"instrusive thoughts on occasion after a bad day\" when therapist inquired about suicidal ideations. He denies any past attempts of suicide. He denies symptoms of becca/hypomania. Patient completed PHQ-9 measure and scored 13, indicating mild severity of depression symptoms. Patient completed PANSI measure, which indicated high risk for suicide due to patient being low on protective factors. It is important to note that patient was low on risk factors and denied any thoughts or plans of harming himself on the PANSI measure.  Based on patient's reported history and presenting symptoms, it appears patient meets DSM-5 criteria for Major Depressive Disorder, single episode, mild.     Patient reports a history of \"anxiety attacks.\" He notes \"it has been a long time\" since experiencing an \"attack.\" His presenting concern was \"a lot of anxiety and increased stress\"because of changes occurring in his life. He reports feeling like life is a \"balancing act\" with college, two jobs and a recent move. Patient endorses the following symptoms: anxiousness, fatigue, difficulty concentrating, chest pains, inability to sleep, poor memory/forgetfulness, indecisiveness, disordered thinking, racing thoughts, and worries. He reports inability to control worry and worrying about different things.  His restlessness, trouble relaxing and difficulty concentrating may be better explained by his diagnosis of ADD, but further assessment is warranted to differentiate from anxiety symptoms. Therapist would like to distinguish that patient's inattention is due to worry and rumination versus attraction to external stimuli. It is also important to note that anxiety symptoms are also a common attribute of Major Depressive Disorder. He denies any specific phobia " "or social anxiety. Patient completed RUSS-7 measure and scored 12, indicating moderate levels of anxiety. Based on patient's presenting symptoms, it appears he meets criteria for Anxiety Disorder, unspecified. Therapist would like to further assess patient's symptoms of anxiety to determine if patient meets criteria for Generalized Anxiety Disorder.     Therapist would like to further assess patient's fixated interest in the Dungeons and Dragons game. He reports that his \"personal life centers around maniaTV and Dragons.\" He notes, \"I don't have a lot of friends, but I have some because we are group that plays Dungeons and Dragons.\" He reports spending a significant amount of his time playing this game, looking things up online, and getting the official books. He reports his relationships are centered around the game as well. He was connected to his friends and his girlfriend because they were also into the game. He further expressed having restricted, fixated interests by saying, \"I'm good at hyper focusing. I choose one thing to get really good at. If I don't get good at it, I quit.\" He also reports a \"weird, interesting fascination with death from  shows and vampires.\"    Patient reports the rare use of marijuana. Therapist will further assess substance use in follow up sessions. Patient also reports what he feels are hallucinations and paranoia. It is not clear if they actually are. He feels it may be due to sleep deprivation. He describes the following situations and examples: when he is in a crowd or around other sounds, he experiences \"mishearing or replacing\" things so he wonders about possible auditory hallucinations. In regards to visual, he states he thinks he sees things at the edge of his vision at times he is sleep deprived. In regards to paranoia, he states having a \"hard time with strangers\" or when he is walking around [place], he goes \"on the side of the road where cars are coming so he can " "see them.\" If he walks alone at night, he \"worries and feels like he is being watched.\" Therapist will further assess for presenting symptoms of psychosis in future follow up sessions.     Therapist recommends continued psychotherapy appointments every 1-2 weeks to address symptoms of ADD, depression and anxiety. Therapist would like to further assess patient's fixated interest, substance use, and reported psychosis to determine accuracy of report and any potential concerns or further diagnoses. Therapist also recommends patient continue to follow up with PCP as needed and take psychotropic medications as prescribed. Patient would benefit from further developing coping skills to manage stress and anxiety. Patient may also benefit from CBT skills and motivational interviewing to address symptoms of depression.       Diagnosis:     1. Mild single current episode of major depressive disorder    2. Anxiety disorder, unspecified type    3. ADD (attention deficit disorder) without hyperactivity       R/O Generalized Anxiety Disorder     Performed and Documented by Iris LIPSCOMB 10/27/2017       "

## 2021-06-13 NOTE — PROGRESS NOTES
Over 25 minutes spent greater than 50% of this counseling regarding following issues:  1. Major depression  Referral for therapy  Begin Lexapro 5 mg, up to 10 mg after 2 weeks.  Discussed potential side effects.  Follow-up 4 weeks.  2. ADD (attention deficit disorder)  Renewal.  I did 1 month only today just for convenience.  Patient will follow up in 1 month.  Doing well, on controlled substances contract.  I am okay with occasional marijuana.  UDS 5/15/17  - dextroamphetamine-amphetamine (ADDERALL XR) 30 MG 24 hr capsule; Take 1 capsule (30 mg total) by mouth every morning.  Dispense: 30 capsule; Refill: 0  - Ambulatory referral to Psychology    Follow-up attention deficit  Narrative: Follow-up  ----------------------------------  Assessment of treatment efficacy: Thinks that it works well, slows thoughts down, helps him focus  Problems with treatment/ side effects: Has chronic trouble sleeping even before the medication, decreased appetite which she thinks might be okay  Duration of treatment: At least a year  Medications: Adderall XR 30  Adherence: Did not discuss  Current employment/ schooling/ plans: Currently working, taking college classes  Other life stressors/situation: Patient feels like he is depressed, see below.  In the process of moving out of his mom's house  Goals of treatment: No specific goal set        Depressed Mood  Narrative: Patient thinks he has had long-standing depression symptoms, mostly passiveness, anhedonia, that he had not recognized for depression in the past.  Thinks this goes back to middle school at which time he did have really bad days.  Now, does not have good days between, more days than not are bad though he does state that he still able to enjoy specific events, sites dungeons and dragons get-togethers with his girlfriend and friends  ---------------------  Duration:maybe middle school- but brief episodes  Onset: Hard to say, sounds like it was not a particular time, gradual  onset  Timing (constant verses episodic): Mostly constant now  Recent life stressors/ Triggers: moving, college classes, alone at times  Context of depression(associated with specific things or generalized?)   Anxiety? Some not a lot.  Avoiding social activities?  No, however he is having difficulty doing well on homework, sometimes can get the motivation to do it  Physical symptoms: Did not discuss  Sleeping well?  No but that is nothing  Thoughts of harming/killing self?  3 passive thoughts.  States that they are easily pushed back.  No plan  Severity/ Degree to which it interferes with living: Now significant, getting worse  Alcohol or other substances/ history of addiction?  Occasional marijuana, perhaps once a month/  Personal history of anxiety of other mental illness?  ADHD, takes medication, I am prescribing that  Family history of  anxiety of other mental illness?  Yes, depression in both mom and grandma  Any history of becca? Elevated mood?  Discussed at length.  Sounds like he has had racing thoughts but they are more associated with ADD.  Clearly get better when stimulant medication is used  Any history of hallucinations/ voices?  No  Anything that you have found helpful?  Trying to stay socially active  Current or previous use of antidepressant medications?  None  Current of previous work with a therapist?  Yes, worked with a therapist through the [facility] as recent as last year, anxiety  Regular exercise?  No program but patient states he walks for transportation since he does not own a car.  About 7000 steps a day  PHQ9=21

## 2021-06-16 NOTE — TELEPHONE ENCOUNTER
Telephone Encounter by Josselyn Hernandez at 4/24/2019  9:34 AM     Author: Josselyn Hernandez Service: -- Author Type: --    Filed: 4/24/2019  9:35 AM Encounter Date: 4/22/2019 Status: Signed    : Josselyn Hernandez APPROVED:    Approval start date:4/23/2019  Approval end date:4/23/2020    Pharmacy has been notified of approval and will contact patient when medication is ready for pickup.

## 2021-06-20 NOTE — PROGRESS NOTES
Patient presents for right great toe pain.  Has history of paronychia and partial toenail removal on the left side.  He has had problems with both medial and lateral side of his right great toe now for a couple of months.  Try to solve the problem himself by taking out the nail but admits this seems to have made things even worse.  Pain is moderate, there is been swelling, no significant discharge.  Exam shows swollen nailbed on both sides.  The medial side of the nail is cut back severely.  Not so much on the lateral side, swelling all around, no significant discharge or crust.  Suggested that I think the only way to manage this is a partial nail removal on both sides.  Patient agreed.  He requested treatment with phenol to make it permanent    Partial toenail removal  Right great Toe both sides  Indication:Paronychia.    Obtained request/consent from patient after discussion of risks and benefits including bleeding, and infection.    7 cc of lidocaine were instilled with digital block, in typical fashion.  Additional injection was done distal to the IP joint on the affected side.  The toe was then tested for anesthesia.    Longitudinal slit was made in the nail with a 0 suture scissors and carried back into the nail bed.  The lateral/affected nail was then twisted off using a needle parra  Was nail  used?  Yes  Was phenol used to ablate the nail bed?  Yes    Prescriptions rendered?  Yes, Tylenol 3, 10 tablets  Toe was sterilely dressed using sterile tube gauze that patient is to leave on overnight.  After this okay to shower.  Follow-up as needed for problems/signs of infection.

## 2021-06-26 NOTE — PROGRESS NOTES
Progress Notes by Home Razo MD at 8/10/2018  3:00 PM     Author: Home Razo MD Service: -- Author Type: Physician    Filed: 8/10/2018  5:44 PM Encounter Date: 8/10/2018 Status: Signed    : Home Razo MD (Physician)       15 minutes spent greater than 50% was counseling regarding following issues    Problem List Items Addressed This Visit        High    ADD (attention deficit disorder) - Primary     Resume Adderall  Urine drug screen, very rarely smokes marijuana, last did so about 2 weeks ago so may be positive, denies other drugs.  She will be negative for Adderall since she has been out for some time.            Unprioritized    Major depressive disorder with single episode, in partial remission (H)     Missing multiple doses of medication, forgets to take in the morning since he is rushed, plans to start taking in the evening regularly.  Reports medication was effective when he was using it, without significant side effects.    PHQ 9= 18    5 to 9: mild   10 to 14: moderate   15 to 19: moderately severe   ?20: severe   Indicates a 1 on suicidal thoughts but explore these further and they are simply passing/passive thoughts, no plans.  Restart Lexapro 20  Aloe up one month if not doing well, follow-up 3 months if things are going well.                 Relevant Medications    dextroamphetamine-amphetamine (ADDERALL XR) 30 MG 24 hr capsule    dextroamphetamine-amphetamine (ADDERALL XR) 30 MG 24 hr capsule (Start on 9/10/2018)    dextroamphetamine-amphetamine (ADDERALL XR) 30 MG 24 hr capsule (Start on 10/10/2018)    escitalopram oxalate (LEXAPRO) 20 MG tablet    Healthcare maintenance     Patient is sexually active, starting a new relationship, and did long-term relationship some months ago, a couple partners in between, always practices safe sex.  Check HIV, gonorrhea chlamydia         Relevant Orders    HIV Antigen/Antibody Screening Cascade    Chlamydia trachomatis &  "Neisseria gonorrhoeae, Amplified Detection    Drugs of Abuse 1,Urine        18-year-old comes in for follow-up on depression and ADD.    Has been taking Adderall for some time.  Been out for 1 or 2 months.  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  Denies any major side effects, takes breaks on the weekend when he does not need it.    Occasional marijuana use, nothing more.    History of major depressive disorder, started Lexapro earlier this year and increase the dose.  Last time he was seen, dosage increased to 20 mg.  Reports that this was quite helpful.  Then, had been taking in the morning and got busy in the morning so that he would forget to take it.  Has stomach upset when he takes it on an empty stomach.  Plans now to start taking it in the evening.    He has had stress with relationships.  Broke up with girlfriend that he been with for sometime in May.  This, in the end was stress reducing.  They did some but only short-term.  Now is in a new, more serious relationship but apparently there is some stress around it and it is \"complicated\"       "

## 2021-06-26 NOTE — PROGRESS NOTES
Progress Notes by Home Razo MD at 3/8/2018  1:20 PM     Author: Home Razo MD Service: -- Author Type: Physician    Filed: 3/8/2018  6:31 PM Encounter Date: 3/8/2018 Status: Signed    : Home Razo MD (Physician)       Over 25 minutes spent greater than 50% of this counseling regarding following issues:      Problem List Items Addressed This Visit        High    ADD (attention deficit disorder)     Renewed 3 months Adderall XR  30 mg,  Plan follow-up at that time so I would be willing to refill for up to 6 months            Unprioritized    Major depressive disorder with single episode, in partial remission - Primary     Patient doing slightly better  Saw a therapist once and has not followed through due to time constraints  Stressors of adequate money, time  PHQ 9= 12    5 to 9: mild   10 to 14: moderate   15 to 19: moderately severe   ?20: severe     Increase Lexapro to 20 mg, follow-up 1 month if able           Relevant Medications    escitalopram oxalate (LEXAPRO) 20 MG tablet    dextroamphetamine-amphetamine (ADDERALL XR) 30 MG 24 hr capsule (Start on 5/8/2018)            DepressionF/U  Narrative: Extent Lexapro helps some, sometimes misses doses and does not have significant side effects  ---------------------  Perception on how things are going: okay  PHQ 9= 12  Problematic symptoms: sleeping an   Symptoms of paranoia or hallucinations?  No  Sleeping well? Yes   Suicidal thoughts? no    Antidepressant medication? Yes  Working with a therapist? No-saw therapist initially and did find her helpful but has not had the time to come    Anything esle found helpful? no  Regular exercise?     Current life stressors: lost job, then has new job- some concern about money, working 2 jobs + colleg  Alcohol or other substances has discussed occasional marijuana in the past    ADD follow-up  Taking weekdays  Helps focus- not needing to fidget  Some suppression of  appetite-but seems such a bad thing.  No other significant problems

## 2021-10-02 ENCOUNTER — HEALTH MAINTENANCE LETTER (OUTPATIENT)
Age: 21
End: 2021-10-02

## 2022-01-22 ENCOUNTER — HEALTH MAINTENANCE LETTER (OUTPATIENT)
Age: 22
End: 2022-01-22

## 2022-09-03 ENCOUNTER — HEALTH MAINTENANCE LETTER (OUTPATIENT)
Age: 22
End: 2022-09-03

## 2023-04-29 ENCOUNTER — HEALTH MAINTENANCE LETTER (OUTPATIENT)
Age: 23
End: 2023-04-29